# Patient Record
Sex: FEMALE | Race: WHITE | NOT HISPANIC OR LATINO | Employment: FULL TIME | URBAN - METROPOLITAN AREA
[De-identification: names, ages, dates, MRNs, and addresses within clinical notes are randomized per-mention and may not be internally consistent; named-entity substitution may affect disease eponyms.]

---

## 2023-08-28 LAB
THYROTROPIN (MIU/L) IN SER/PLAS BY DETECTION LIMIT <= 0.05 MIU/L: 3.92 MIU/L (ref 0.44–3.98)
THYROXINE (T4) FREE (NG/DL) IN SER/PLAS: 0.86 NG/DL (ref 0.61–1.12)

## 2023-08-29 LAB — TRIIODOTHYRONINE (T3) FREE (PG/ML) IN SER/PLAS: 4.2 PG/ML (ref 2.3–4.2)

## 2023-09-01 LAB
THYROGLOBULIN AB (IU/ML) IN SER/PLAS: <0.9 IU/ML (ref 0–4)
THYROGLOBULIN LC-MS/MS: ABNORMAL NG/ML (ref 1.3–31.8)
THYROGLOBULIN: <0.1 NG/ML (ref 1.3–31.8)

## 2023-11-27 DIAGNOSIS — I10 HTN (HYPERTENSION), BENIGN: Primary | ICD-10-CM

## 2023-11-29 RX ORDER — DILTIAZEM HYDROCHLORIDE 300 MG/1
300 CAPSULE, EXTENDED RELEASE ORAL DAILY
Qty: 90 CAPSULE | Refills: 0 | Status: SHIPPED | OUTPATIENT
Start: 2023-11-29 | End: 2023-12-20 | Stop reason: SDUPTHER

## 2023-12-20 ENCOUNTER — OFFICE VISIT (OUTPATIENT)
Dept: PRIMARY CARE | Facility: CLINIC | Age: 58
End: 2023-12-20
Payer: OTHER GOVERNMENT

## 2023-12-20 VITALS
DIASTOLIC BLOOD PRESSURE: 79 MMHG | BODY MASS INDEX: 24.34 KG/M2 | HEART RATE: 60 BPM | TEMPERATURE: 96.6 F | HEIGHT: 64 IN | WEIGHT: 142.6 LBS | SYSTOLIC BLOOD PRESSURE: 133 MMHG

## 2023-12-20 DIAGNOSIS — C73 MALIGNANT NEOPLASM OF THYROID GLAND (MULTI): Primary | ICD-10-CM

## 2023-12-20 DIAGNOSIS — Z00.00 WELL ADULT HEALTH CHECK: ICD-10-CM

## 2023-12-20 DIAGNOSIS — J30.9 ALLERGIC RHINITIS, UNSPECIFIED SEASONALITY, UNSPECIFIED TRIGGER: ICD-10-CM

## 2023-12-20 DIAGNOSIS — E89.0 HYPOTHYROIDISM, POSTSURGICAL: ICD-10-CM

## 2023-12-20 DIAGNOSIS — K57.90 DIVERTICULOSIS: ICD-10-CM

## 2023-12-20 DIAGNOSIS — Z12.31 ENCOUNTER FOR SCREENING MAMMOGRAM FOR MALIGNANT NEOPLASM OF BREAST: ICD-10-CM

## 2023-12-20 DIAGNOSIS — G43.809 OTHER MIGRAINE WITHOUT STATUS MIGRAINOSUS, NOT INTRACTABLE: ICD-10-CM

## 2023-12-20 DIAGNOSIS — K57.30 DIVERTICULOSIS OF COLON WITHOUT DIVERTICULITIS: ICD-10-CM

## 2023-12-20 DIAGNOSIS — E78.00 HYPERCHOLESTEROLEMIA: ICD-10-CM

## 2023-12-20 DIAGNOSIS — I10 HTN (HYPERTENSION), BENIGN: ICD-10-CM

## 2023-12-20 PROBLEM — R63.5 ABNORMAL WEIGHT GAIN: Status: ACTIVE | Noted: 2023-12-20

## 2023-12-20 PROBLEM — G43.909 MIGRAINE WITHOUT STATUS MIGRAINOSUS, NOT INTRACTABLE: Status: ACTIVE | Noted: 2023-12-20

## 2023-12-20 PROBLEM — R10.9 ABDOMINAL PAIN: Status: ACTIVE | Noted: 2023-12-20

## 2023-12-20 PROBLEM — K62.5 RECTAL BLEED: Status: ACTIVE | Noted: 2023-12-20

## 2023-12-20 PROBLEM — R53.83 FATIGUE: Status: ACTIVE | Noted: 2023-12-20

## 2023-12-20 PROBLEM — K63.5 COLON POLYPS: Status: ACTIVE | Noted: 2019-01-18

## 2023-12-20 PROCEDURE — 3078F DIAST BP <80 MM HG: CPT | Performed by: INTERNAL MEDICINE

## 2023-12-20 PROCEDURE — 3075F SYST BP GE 130 - 139MM HG: CPT | Performed by: INTERNAL MEDICINE

## 2023-12-20 PROCEDURE — 1036F TOBACCO NON-USER: CPT | Performed by: INTERNAL MEDICINE

## 2023-12-20 PROCEDURE — 99214 OFFICE O/P EST MOD 30 MIN: CPT | Performed by: INTERNAL MEDICINE

## 2023-12-20 RX ORDER — DILTIAZEM HYDROCHLORIDE 300 MG/1
300 CAPSULE, EXTENDED RELEASE ORAL DAILY
Qty: 90 CAPSULE | Refills: 1 | Status: SHIPPED | OUTPATIENT
Start: 2023-12-20

## 2023-12-20 RX ORDER — DESLORATADINE 5 MG/1
5 TABLET ORAL
Qty: 90 TABLET | Refills: 1 | Status: SHIPPED | OUTPATIENT
Start: 2023-12-20

## 2023-12-20 RX ORDER — LIOTHYRONINE SODIUM 5 UG/1
2 TABLET ORAL DAILY
COMMUNITY
Start: 2009-07-29 | End: 2024-05-08

## 2023-12-20 RX ORDER — DESLORATADINE 5 MG/1
5 TABLET ORAL
COMMUNITY
End: 2023-12-20 | Stop reason: SDUPTHER

## 2023-12-20 RX ORDER — LEVOTHYROXINE SODIUM 100 UG/1
100 TABLET ORAL
COMMUNITY
Start: 2014-01-29 | End: 2024-05-08 | Stop reason: SDUPTHER

## 2023-12-20 RX ORDER — MONTELUKAST SODIUM 10 MG/1
10 TABLET ORAL NIGHTLY
COMMUNITY
Start: 2023-11-09

## 2023-12-20 ASSESSMENT — PATIENT HEALTH QUESTIONNAIRE - PHQ9
SUM OF ALL RESPONSES TO PHQ9 QUESTIONS 1 AND 2: 0
2. FEELING DOWN, DEPRESSED OR HOPELESS: NOT AT ALL
1. LITTLE INTEREST OR PLEASURE IN DOING THINGS: NOT AT ALL

## 2024-03-01 ENCOUNTER — HOSPITAL ENCOUNTER (OUTPATIENT)
Dept: RADIOLOGY | Facility: CLINIC | Age: 59
Discharge: HOME | End: 2024-03-01
Payer: OTHER GOVERNMENT

## 2024-03-01 ENCOUNTER — LAB (OUTPATIENT)
Dept: LAB | Facility: LAB | Age: 59
End: 2024-03-01
Payer: OTHER GOVERNMENT

## 2024-03-01 VITALS — WEIGHT: 142.64 LBS | HEIGHT: 64 IN | BODY MASS INDEX: 24.35 KG/M2

## 2024-03-01 DIAGNOSIS — C73 MALIGNANT NEOPLASM OF THYROID GLAND (MULTI): Primary | ICD-10-CM

## 2024-03-01 DIAGNOSIS — Z12.31 ENCOUNTER FOR SCREENING MAMMOGRAM FOR MALIGNANT NEOPLASM OF BREAST: ICD-10-CM

## 2024-03-01 DIAGNOSIS — E89.0 POSTPROCEDURAL HYPOTHYROIDISM: ICD-10-CM

## 2024-03-01 LAB
T4 FREE SERPL-MCNC: 0.76 NG/DL (ref 0.61–1.12)
TSH SERPL-ACNC: 3.82 MIU/L (ref 0.44–3.98)

## 2024-03-01 PROCEDURE — 84432 ASSAY OF THYROGLOBULIN: CPT

## 2024-03-01 PROCEDURE — 36415 COLL VENOUS BLD VENIPUNCTURE: CPT

## 2024-03-01 PROCEDURE — 84443 ASSAY THYROID STIM HORMONE: CPT

## 2024-03-01 PROCEDURE — 77063 BREAST TOMOSYNTHESIS BI: CPT | Performed by: STUDENT IN AN ORGANIZED HEALTH CARE EDUCATION/TRAINING PROGRAM

## 2024-03-01 PROCEDURE — 77067 SCR MAMMO BI INCL CAD: CPT

## 2024-03-01 PROCEDURE — 77067 SCR MAMMO BI INCL CAD: CPT | Performed by: STUDENT IN AN ORGANIZED HEALTH CARE EDUCATION/TRAINING PROGRAM

## 2024-03-01 PROCEDURE — 84439 ASSAY OF FREE THYROXINE: CPT

## 2024-03-01 PROCEDURE — 86800 THYROGLOBULIN ANTIBODY: CPT

## 2024-03-04 ENCOUNTER — HOSPITAL ENCOUNTER (OUTPATIENT)
Dept: RADIOLOGY | Facility: EXTERNAL LOCATION | Age: 59
Discharge: HOME | End: 2024-03-04

## 2024-03-04 LAB
BILL ONLY-THYROGLOBULIN: NORMAL
THYROGLOB AB SERPL-ACNC: <0.9 IU/ML (ref 0–4)
THYROGLOB SERPL-MCNC: <0.1 NG/ML (ref 1.3–31.8)
THYROGLOB SERPL-MCNC: ABNORMAL NG/ML (ref 1.3–31.8)

## 2024-03-05 ENCOUNTER — LAB (OUTPATIENT)
Dept: LAB | Facility: LAB | Age: 59
End: 2024-03-05
Payer: OTHER GOVERNMENT

## 2024-03-05 DIAGNOSIS — Z00.00 WELL ADULT HEALTH CHECK: ICD-10-CM

## 2024-03-05 DIAGNOSIS — E78.00 HYPERCHOLESTEROLEMIA: ICD-10-CM

## 2024-03-05 DIAGNOSIS — K57.90 DIVERTICULOSIS: ICD-10-CM

## 2024-03-05 LAB
ALBUMIN SERPL BCP-MCNC: 4.2 G/DL (ref 3.4–5)
ALP SERPL-CCNC: 86 U/L (ref 33–110)
ALT SERPL W P-5'-P-CCNC: 21 U/L (ref 7–45)
ANION GAP SERPL CALC-SCNC: 12 MMOL/L (ref 10–20)
AST SERPL W P-5'-P-CCNC: 19 U/L (ref 9–39)
BASOPHILS # BLD AUTO: 0.05 X10*3/UL (ref 0–0.1)
BASOPHILS NFR BLD AUTO: 0.8 %
BILIRUB SERPL-MCNC: 0.6 MG/DL (ref 0–1.2)
BUN SERPL-MCNC: 24 MG/DL (ref 6–23)
CALCIUM SERPL-MCNC: 9.6 MG/DL (ref 8.6–10.6)
CHLORIDE SERPL-SCNC: 103 MMOL/L (ref 98–107)
CHOLEST SERPL-MCNC: 215 MG/DL (ref 0–199)
CHOLESTEROL/HDL RATIO: 3.1
CO2 SERPL-SCNC: 32 MMOL/L (ref 21–32)
CREAT SERPL-MCNC: 0.89 MG/DL (ref 0.5–1.05)
EGFRCR SERPLBLD CKD-EPI 2021: 76 ML/MIN/1.73M*2
EOSINOPHIL # BLD AUTO: 0.34 X10*3/UL (ref 0–0.7)
EOSINOPHIL NFR BLD AUTO: 5.4 %
ERYTHROCYTE [DISTWIDTH] IN BLOOD BY AUTOMATED COUNT: 12.5 % (ref 11.5–14.5)
GLUCOSE SERPL-MCNC: 95 MG/DL (ref 74–99)
HCT VFR BLD AUTO: 46.4 % (ref 36–46)
HDLC SERPL-MCNC: 70.4 MG/DL
HGB BLD-MCNC: 15.7 G/DL (ref 12–16)
IMM GRANULOCYTES # BLD AUTO: 0.01 X10*3/UL (ref 0–0.7)
IMM GRANULOCYTES NFR BLD AUTO: 0.2 % (ref 0–0.9)
LDLC SERPL CALC-MCNC: 134 MG/DL
LYMPHOCYTES # BLD AUTO: 1.61 X10*3/UL (ref 1.2–4.8)
LYMPHOCYTES NFR BLD AUTO: 25.4 %
MCH RBC QN AUTO: 30.2 PG (ref 26–34)
MCHC RBC AUTO-ENTMCNC: 33.8 G/DL (ref 32–36)
MCV RBC AUTO: 89 FL (ref 80–100)
MONOCYTES # BLD AUTO: 0.44 X10*3/UL (ref 0.1–1)
MONOCYTES NFR BLD AUTO: 6.9 %
NEUTROPHILS # BLD AUTO: 3.89 X10*3/UL (ref 1.2–7.7)
NEUTROPHILS NFR BLD AUTO: 61.3 %
NON HDL CHOLESTEROL: 145 MG/DL (ref 0–149)
NRBC BLD-RTO: 0 /100 WBCS (ref 0–0)
PLATELET # BLD AUTO: 269 X10*3/UL (ref 150–450)
POTASSIUM SERPL-SCNC: 4.5 MMOL/L (ref 3.5–5.3)
PROT SERPL-MCNC: 6.5 G/DL (ref 6.4–8.2)
RBC # BLD AUTO: 5.2 X10*6/UL (ref 4–5.2)
SODIUM SERPL-SCNC: 142 MMOL/L (ref 136–145)
TRIGL SERPL-MCNC: 51 MG/DL (ref 0–149)
VLDL: 10 MG/DL (ref 0–40)
WBC # BLD AUTO: 6.3 X10*3/UL (ref 4.4–11.3)

## 2024-03-05 PROCEDURE — 80053 COMPREHEN METABOLIC PANEL: CPT

## 2024-03-05 PROCEDURE — 36415 COLL VENOUS BLD VENIPUNCTURE: CPT

## 2024-03-05 PROCEDURE — 80061 LIPID PANEL: CPT

## 2024-03-05 PROCEDURE — 85025 COMPLETE CBC W/AUTO DIFF WBC: CPT

## 2024-04-16 ENCOUNTER — TELEPHONE (OUTPATIENT)
Dept: PRIMARY CARE | Facility: CLINIC | Age: 59
End: 2024-04-16
Payer: OTHER GOVERNMENT

## 2024-04-16 DIAGNOSIS — N64.89 BREAST ASYMMETRY: ICD-10-CM

## 2024-04-16 DIAGNOSIS — R92.8 ABNORMAL MAMMOGRAM: Primary | ICD-10-CM

## 2024-04-16 NOTE — TELEPHONE ENCOUNTER
Patient sent My Chart message for additional testing after her mammogram came back abnormal.  New order was placed in chart for diagnostic ultrasound.  Patient was notified and verbalized understanding.

## 2024-05-06 ENCOUNTER — LAB (OUTPATIENT)
Dept: LAB | Facility: LAB | Age: 59
End: 2024-05-06
Payer: OTHER GOVERNMENT

## 2024-05-06 ENCOUNTER — TELEPHONE (OUTPATIENT)
Dept: ENDOCRINOLOGY | Facility: CLINIC | Age: 59
End: 2024-05-06
Payer: OTHER GOVERNMENT

## 2024-05-06 DIAGNOSIS — C73 MALIGNANT NEOPLASM OF THYROID GLAND (MULTI): ICD-10-CM

## 2024-05-06 DIAGNOSIS — E89.0 HYPOTHYROIDISM, POSTSURGICAL: Primary | ICD-10-CM

## 2024-05-06 DIAGNOSIS — E89.0 HYPOTHYROIDISM, POSTSURGICAL: ICD-10-CM

## 2024-05-06 PROCEDURE — 84439 ASSAY OF FREE THYROXINE: CPT

## 2024-05-06 PROCEDURE — 84481 FREE ASSAY (FT-3): CPT

## 2024-05-06 PROCEDURE — 36415 COLL VENOUS BLD VENIPUNCTURE: CPT

## 2024-05-06 PROCEDURE — 84443 ASSAY THYROID STIM HORMONE: CPT

## 2024-05-06 NOTE — TELEPHONE ENCOUNTER
Marcia left a VM 5-6-24 stating she has an appt. 5-8-24 and asked if you have  Any labs you want done prior?  I can call her back and let her know today.  Please advise.  Thanks

## 2024-05-07 LAB
T3FREE SERPL-MCNC: 3.9 PG/ML (ref 2.3–4.2)
T4 FREE SERPL-MCNC: 1.27 NG/DL (ref 0.78–1.48)
TSH SERPL-ACNC: 0.85 MIU/L (ref 0.44–3.98)

## 2024-05-08 ENCOUNTER — OFFICE VISIT (OUTPATIENT)
Dept: ENDOCRINOLOGY | Facility: CLINIC | Age: 59
End: 2024-05-08
Payer: OTHER GOVERNMENT

## 2024-05-08 VITALS
HEART RATE: 77 BPM | HEIGHT: 64 IN | WEIGHT: 142.2 LBS | BODY MASS INDEX: 24.28 KG/M2 | SYSTOLIC BLOOD PRESSURE: 149 MMHG | DIASTOLIC BLOOD PRESSURE: 85 MMHG

## 2024-05-08 DIAGNOSIS — C73 MALIGNANT NEOPLASM OF THYROID GLAND (MULTI): ICD-10-CM

## 2024-05-08 DIAGNOSIS — E89.0 HYPOTHYROIDISM, POSTSURGICAL: ICD-10-CM

## 2024-05-08 DIAGNOSIS — E89.0 HYPOTHYROIDISM, POSTSURGICAL: Primary | ICD-10-CM

## 2024-05-08 PROCEDURE — 3079F DIAST BP 80-89 MM HG: CPT | Performed by: STUDENT IN AN ORGANIZED HEALTH CARE EDUCATION/TRAINING PROGRAM

## 2024-05-08 PROCEDURE — 3077F SYST BP >= 140 MM HG: CPT | Performed by: STUDENT IN AN ORGANIZED HEALTH CARE EDUCATION/TRAINING PROGRAM

## 2024-05-08 PROCEDURE — 99214 OFFICE O/P EST MOD 30 MIN: CPT | Performed by: STUDENT IN AN ORGANIZED HEALTH CARE EDUCATION/TRAINING PROGRAM

## 2024-05-08 RX ORDER — LIOTHYRONINE SODIUM 5 UG/1
10 TABLET ORAL DAILY
Qty: 180 TABLET | Refills: 1 | Status: SHIPPED | OUTPATIENT
Start: 2024-05-08

## 2024-05-08 RX ORDER — LEVOTHYROXINE SODIUM 100 UG/1
TABLET ORAL
Qty: 90 TABLET | Refills: 1 | Status: SHIPPED | OUTPATIENT
Start: 2024-05-08

## 2024-05-08 NOTE — PROGRESS NOTES
Subjective   Patient ID: Marcia Krishna is a 58 y.o. female who presents for Thyroid Cancer (59 yo female with ho PTC s/p total thyroidectomy in 2007 by Dr. 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  HPI    The patient is a 59 yo female with ho PTC s/p total thyroidectomy in 2007 by Dr. 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  She was on Suppressive LT4 therpay for > 10 years , now with wth normal TSH goals   she reports having difficulty maintaining weight and energy and on lower Lt4 dose , she is currently o n 100 mcg  daily   Was on 100 mcg 6 pills/ week  , prior to that 112 mcg 6 pills/week      1/2023 TSH 2.3 , TG 0.1  Review of Systems    Objective   Physical Exam  Constitutional:       Appearance: Normal appearance.   Neck:      Thyroid: No thyroid mass.   Cardiovascular:      Rate and Rhythm: Normal rate and regular rhythm.   Pulmonary:      Effort: Pulmonary effort is normal.      Breath sounds: Normal breath sounds.   Lymphadenopathy:      Cervical: No cervical adenopathy.   Neurological:      General: No focal deficit present.      Mental Status: She is alert.   Psychiatric:         Mood and Affect: Mood normal.         Assessment/Plan        The patient is a 59 yo female with ho PTC s/p total thyroidectomy in 2007 by Dr. 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 TG 0.1     RTC in 6 months with labs

## 2024-05-28 ENCOUNTER — HOSPITAL ENCOUNTER (OUTPATIENT)
Dept: RADIOLOGY | Facility: CLINIC | Age: 59
Discharge: HOME | End: 2024-05-28
Payer: OTHER GOVERNMENT

## 2024-05-28 DIAGNOSIS — N64.89 BREAST ASYMMETRY: ICD-10-CM

## 2024-05-28 DIAGNOSIS — R92.8 ABNORMAL MAMMOGRAM: ICD-10-CM

## 2024-05-28 PROCEDURE — 76642 ULTRASOUND BREAST LIMITED: CPT | Mod: LT

## 2024-05-28 PROCEDURE — 77065 DX MAMMO INCL CAD UNI: CPT | Mod: LEFT SIDE | Performed by: RADIOLOGY

## 2024-05-28 PROCEDURE — 76982 USE 1ST TARGET LESION: CPT | Mod: LT

## 2024-05-28 PROCEDURE — 77061 BREAST TOMOSYNTHESIS UNI: CPT | Mod: LT

## 2024-05-28 PROCEDURE — 77061 BREAST TOMOSYNTHESIS UNI: CPT | Mod: LEFT SIDE | Performed by: RADIOLOGY

## 2024-05-28 PROCEDURE — 76642 ULTRASOUND BREAST LIMITED: CPT | Mod: LEFT SIDE | Performed by: RADIOLOGY

## 2024-06-19 DIAGNOSIS — J30.9 ALLERGIC RHINITIS, UNSPECIFIED SEASONALITY, UNSPECIFIED TRIGGER: ICD-10-CM

## 2024-06-20 RX ORDER — DESLORATADINE 5 MG/1
5 TABLET ORAL DAILY
Qty: 90 TABLET | Refills: 0 | Status: SHIPPED | OUTPATIENT
Start: 2024-06-20

## 2024-06-20 RX ORDER — MONTELUKAST SODIUM 10 MG/1
10 TABLET ORAL DAILY
Qty: 90 TABLET | Refills: 0 | Status: SHIPPED | OUTPATIENT
Start: 2024-06-20

## 2024-07-17 DIAGNOSIS — I10 HTN (HYPERTENSION), BENIGN: ICD-10-CM

## 2024-07-18 RX ORDER — DILTIAZEM HYDROCHLORIDE 300 MG/1
300 CAPSULE, EXTENDED RELEASE ORAL DAILY
Qty: 90 CAPSULE | Refills: 3 | Status: SHIPPED | OUTPATIENT
Start: 2024-07-18

## 2024-09-10 DIAGNOSIS — J30.9 ALLERGIC RHINITIS, UNSPECIFIED SEASONALITY, UNSPECIFIED TRIGGER: ICD-10-CM

## 2024-09-12 RX ORDER — DESLORATADINE 5 MG/1
5 TABLET ORAL DAILY
Qty: 90 TABLET | Refills: 1 | Status: SHIPPED | OUTPATIENT
Start: 2024-09-12

## 2024-09-25 DIAGNOSIS — J30.9 ALLERGIC RHINITIS, UNSPECIFIED SEASONALITY, UNSPECIFIED TRIGGER: ICD-10-CM

## 2024-09-25 DIAGNOSIS — E89.0 HYPOTHYROIDISM, POSTSURGICAL: ICD-10-CM

## 2024-09-26 RX ORDER — LIOTHYRONINE SODIUM 5 UG/1
10 TABLET ORAL DAILY
Qty: 180 TABLET | Refills: 0 | Status: SHIPPED | OUTPATIENT
Start: 2024-09-26

## 2024-09-26 RX ORDER — LEVOTHYROXINE SODIUM 100 UG/1
TABLET ORAL
Qty: 90 TABLET | Refills: 0 | Status: SHIPPED | OUTPATIENT
Start: 2024-09-26

## 2024-09-27 RX ORDER — MONTELUKAST SODIUM 10 MG/1
10 TABLET ORAL DAILY
Qty: 90 TABLET | Refills: 0 | Status: SHIPPED | OUTPATIENT
Start: 2024-09-27

## 2024-11-14 ENCOUNTER — APPOINTMENT (OUTPATIENT)
Dept: ENDOCRINOLOGY | Facility: CLINIC | Age: 59
End: 2024-11-14
Payer: OTHER GOVERNMENT

## 2024-11-20 ENCOUNTER — OFFICE VISIT (OUTPATIENT)
Dept: PRIMARY CARE | Facility: CLINIC | Age: 59
End: 2024-11-20
Payer: OTHER GOVERNMENT

## 2024-11-20 ENCOUNTER — HOSPITAL ENCOUNTER (OUTPATIENT)
Dept: RADIOLOGY | Facility: CLINIC | Age: 59
Discharge: HOME | End: 2024-11-20
Payer: OTHER GOVERNMENT

## 2024-11-20 ENCOUNTER — LAB (OUTPATIENT)
Dept: LAB | Facility: LAB | Age: 59
End: 2024-11-20
Payer: OTHER GOVERNMENT

## 2024-11-20 VITALS
HEART RATE: 78 BPM | SYSTOLIC BLOOD PRESSURE: 136 MMHG | WEIGHT: 142 LBS | DIASTOLIC BLOOD PRESSURE: 92 MMHG | OXYGEN SATURATION: 96 % | BODY MASS INDEX: 24.24 KG/M2 | HEIGHT: 64 IN

## 2024-11-20 DIAGNOSIS — J06.9 ACUTE UPPER RESPIRATORY INFECTION: ICD-10-CM

## 2024-11-20 DIAGNOSIS — J04.0 LARYNGITIS: ICD-10-CM

## 2024-11-20 DIAGNOSIS — I10 HTN (HYPERTENSION), BENIGN: ICD-10-CM

## 2024-11-20 DIAGNOSIS — R06.2 WHEEZING: ICD-10-CM

## 2024-11-20 DIAGNOSIS — J04.0 LARYNGITIS: Primary | ICD-10-CM

## 2024-11-20 DIAGNOSIS — J30.9 ALLERGIC RHINITIS, UNSPECIFIED SEASONALITY, UNSPECIFIED TRIGGER: ICD-10-CM

## 2024-11-20 DIAGNOSIS — E89.0 HYPOTHYROIDISM, POSTSURGICAL: ICD-10-CM

## 2024-11-20 LAB — T4 FREE SERPL-MCNC: 1.27 NG/DL (ref 0.78–1.48)

## 2024-11-20 PROCEDURE — 3080F DIAST BP >= 90 MM HG: CPT | Performed by: INTERNAL MEDICINE

## 2024-11-20 PROCEDURE — 3075F SYST BP GE 130 - 139MM HG: CPT | Performed by: INTERNAL MEDICINE

## 2024-11-20 PROCEDURE — 1036F TOBACCO NON-USER: CPT | Performed by: INTERNAL MEDICINE

## 2024-11-20 PROCEDURE — 84439 ASSAY OF FREE THYROXINE: CPT

## 2024-11-20 PROCEDURE — 36415 COLL VENOUS BLD VENIPUNCTURE: CPT

## 2024-11-20 PROCEDURE — 71046 X-RAY EXAM CHEST 2 VIEWS: CPT | Performed by: RADIOLOGY

## 2024-11-20 PROCEDURE — 71046 X-RAY EXAM CHEST 2 VIEWS: CPT

## 2024-11-20 PROCEDURE — 99214 OFFICE O/P EST MOD 30 MIN: CPT | Performed by: INTERNAL MEDICINE

## 2024-11-20 PROCEDURE — 3008F BODY MASS INDEX DOCD: CPT | Performed by: INTERNAL MEDICINE

## 2024-11-20 PROCEDURE — 86800 THYROGLOBULIN ANTIBODY: CPT | Performed by: STUDENT IN AN ORGANIZED HEALTH CARE EDUCATION/TRAINING PROGRAM

## 2024-11-20 RX ORDER — ALBUTEROL SULFATE 90 UG/1
2 INHALANT RESPIRATORY (INHALATION) EVERY 4 HOURS PRN
Qty: 8.5 G | Refills: 0 | Status: SHIPPED | OUTPATIENT
Start: 2024-11-20 | End: 2025-11-20

## 2024-11-20 RX ORDER — AMOXICILLIN AND CLAVULANATE POTASSIUM 875; 125 MG/1; MG/1
875 TABLET, FILM COATED ORAL 2 TIMES DAILY
Qty: 20 TABLET | Refills: 0 | Status: SHIPPED | OUTPATIENT
Start: 2024-11-20 | End: 2024-11-20

## 2024-11-20 RX ORDER — PREDNISONE 10 MG/1
TABLET ORAL
Qty: 30 TABLET | Refills: 0 | Status: SHIPPED | OUTPATIENT
Start: 2024-11-20 | End: 2024-11-30

## 2024-11-20 RX ORDER — CETIRIZINE HYDROCHLORIDE 10 MG/1
10 TABLET ORAL DAILY
Start: 2024-11-20 | End: 2025-02-18

## 2024-11-20 RX ORDER — ALBUTEROL SULFATE 90 UG/1
2 INHALANT RESPIRATORY (INHALATION) EVERY 4 HOURS PRN
Qty: 8.5 G | Refills: 0 | Status: SHIPPED | OUTPATIENT
Start: 2024-11-20 | End: 2024-11-20

## 2024-11-20 RX ORDER — AMOXICILLIN AND CLAVULANATE POTASSIUM 875; 125 MG/1; MG/1
875 TABLET, FILM COATED ORAL 2 TIMES DAILY
Qty: 20 TABLET | Refills: 0 | Status: SHIPPED | OUTPATIENT
Start: 2024-11-20 | End: 2024-11-30

## 2024-11-20 RX ORDER — PANTOPRAZOLE SODIUM 40 MG/1
40 TABLET, DELAYED RELEASE ORAL DAILY
Qty: 30 TABLET | Refills: 0 | Status: SHIPPED | OUTPATIENT
Start: 2024-11-20 | End: 2025-01-19

## 2024-11-20 RX ORDER — PREDNISONE 10 MG/1
TABLET ORAL
Qty: 30 TABLET | Refills: 0 | Status: SHIPPED | OUTPATIENT
Start: 2024-11-20 | End: 2024-11-20

## 2024-11-20 ASSESSMENT — PATIENT HEALTH QUESTIONNAIRE - PHQ9
2. FEELING DOWN, DEPRESSED OR HOPELESS: NOT AT ALL
1. LITTLE INTEREST OR PLEASURE IN DOING THINGS: NOT AT ALL
SUM OF ALL RESPONSES TO PHQ9 QUESTIONS 1 AND 2: 0

## 2024-11-20 ASSESSMENT — LIFESTYLE VARIABLES
HOW OFTEN DO YOU HAVE SIX OR MORE DRINKS ON ONE OCCASION: PATIENT DECLINED
SKIP TO QUESTIONS 9-10: 0
HOW MANY STANDARD DRINKS CONTAINING ALCOHOL DO YOU HAVE ON A TYPICAL DAY: 1 OR 2
HOW OFTEN DO YOU HAVE A DRINK CONTAINING ALCOHOL: 2-4 TIMES A MONTH
AUDIT-C TOTAL SCORE: -1

## 2024-11-20 NOTE — PROGRESS NOTES
Assessment/Plan     59 years old female who has history of hypertension has slightly elevated blood pressure on this visit but she says it is situational because of her throat discomfort.  She is not on any high antihypertensive medication and she understands to watch the blood pressure.    Patient has acute upper respiratory tract infection with probably laryngitis on my clinical exam.  But because of her wheezing and significant symptoms she will have a chest x-ray done to make sure there is no underlying acute lung pathology.  She will be treated with tapering dose of steroids and Zyrtec.  Will put her on Augmentin for the infection.  We will empirically also treat her with pantoprazole.  She was advised to follow-up with the PCP in about 3 to 4 weeks.  Patient also understands that if she gets significantly short of breath or any other concern she should immediately go to the ER.        Problem List Items Addressed This Visit       HTN (hypertension), benign    Acute upper respiratory infection    Relevant Orders    XR chest 2 views    Allergic rhinitis    Relevant Medications    albuterol (ProAir HFA) 90 mcg/actuation inhaler    Laryngitis - Primary    Relevant Medications    cetirizine (ZyrTEC) 10 mg tablet    amoxicillin-pot clavulanate (Augmentin) 875-125 mg tablet    predniSONE (Deltasone) 10 mg tablet    pantoprazole (ProtoNix) 40 mg EC tablet    Other Relevant Orders    XR chest 2 views    Wheezing    Relevant Medications    cetirizine (ZyrTEC) 10 mg tablet    albuterol (ProAir HFA) 90 mcg/actuation inhaler    predniSONE (Deltasone) 10 mg tablet    Other Relevant Orders    XR chest 2 views       Subjective     Patient ID: Marcia Krishna is a 59 y.o. female who presents for Asthma (Hurts to swallow, allergies x Sunday).    History of present illness  59 years old female who has had a history of asthma came for an acute visit having some discomfort in the lower throat area and also feeling like  wheezing.    Patient was [place] cabin [place] and her 3 dogs.  She says every time she goes to the cabin she gets some symptoms like this.  She stayed there for few days and came back and she developed some discomfort in the throat and now she has pain when she is swallowing.  There is no pain when she is taking a deep breath but she says when she is exhaling she has irritation and coughing.  She also has pain when she swallows food.  She has no fever or chills.  She denies anyone who visited the cabin is ill.  She has no chest pain.  She denies any short of breath on exertion she is doing her normal activities.  No palpitation no urine bowel changes.  No abdominal pain.  She has had previously some reflux but she does not feel like this is her reflux bothering her.  She takes Singulair regularly but she does not take any preventative inhalers.  She had some updated albuterol inhaler which she was trying to use it but did not help.    She has Zyrtec at home and she is not taking it at this time.  She is also not taking the Clarinex.    Family History   Problem Relation Name Age of Onset    Hypertension Mother      Osteoporosis Mother      Stroke Mother      Other (cardiac disorder) Father      Other (coagulation disorder) Father      Hypertension Father        Social History     Socioeconomic History    Marital status:      Spouse name: Not on file    Number of children: Not on file    Years of education: Not on file    Highest education level: Not on file   Occupational History    Not on file   Tobacco Use    Smoking status: Never     Passive exposure: Past    Smokeless tobacco: Never   Vaping Use    Vaping status: Never Used   Substance and Sexual Activity    Alcohol use: Yes     Alcohol/week: 1.0 standard drink of alcohol     Types: 1 Standard drinks or equivalent per week    Drug use: Never    Sexual activity: Not on file   Other Topics Concern    Not on file   Social History Narrative    Not  on file     Social Drivers of Health     Financial Resource Strain: Not on file   Food Insecurity: Not on file   Transportation Needs: Not on file   Physical Activity: Not on file   Stress: Not on file   Social Connections: Not on file   Intimate Partner Violence: Not on file   Housing Stability: Not on file      Mold, Pine needle oil, and Pollen extracts   Current Outpatient Medications   Medication Sig Dispense Refill    desloratadine (Clarinex) 5 mg tablet TAKE 1 TABLET DAILY 90 tablet 1    dilTIAZem ER (Tiazac) 300 mg 24 hr capsule TAKE 1 CAPSULE DAILY 90 capsule 3    levothyroxine (Synthroid, Levoxyl) 100 mcg tablet TAKE 1 TABLET DAILY 90 tablet 0    liothyronine (Cytomel) 5 mcg tablet TAKE 2 TABLETS DAILY 180 tablet 0    montelukast (Singulair) 10 mg tablet TAKE 1 TABLET DAILY AS DIRECTED 90 tablet 0    albuterol (ProAir HFA) 90 mcg/actuation inhaler Inhale 2 puffs every 4 hours if needed for wheezing or shortness of breath. 8.5 g 0    amoxicillin-pot clavulanate (Augmentin) 875-125 mg tablet Take 1 tablet (875 mg) by mouth 2 times a day for 10 days. 20 tablet 0    cetirizine (ZyrTEC) 10 mg tablet Take 1 tablet (10 mg) by mouth once daily.      pantoprazole (ProtoNix) 40 mg EC tablet Take 1 tablet (40 mg) by mouth once daily. Do not crush, chew, or split. 30 tablet 0    predniSONE (Deltasone) 10 mg tablet Take 5 tablets (50 mg) by mouth once daily for 2 days, THEN 4 tablets (40 mg) once daily for 2 days, THEN 3 tablets (30 mg) once daily for 2 days, THEN 2 tablets (20 mg) once daily for 2 days, THEN 1 tablet (10 mg) once daily for 2 days. 30 tablet 0     No current facility-administered medications for this visit.        Objective     Vitals:    11/20/24 0926   BP: (!) 136/92   Pulse: 78   SpO2: 96%        Physical Exam  Alert, oriented x3, not in distress.  Not pale, no cyanosis, no icterus. No skin rash  Neck:  Supple.  No JVD. No thyromegaly, trachea in the midline  No clubbing.  Throat is mild inflamed but  no tonsillar exudates. No lymphadenopathy. Ears mild congested tympanic membrane but no discharge or perforation.  Has mild discomfort on the anterior larynx on palpation.  No redness or warmness.  No soft tissue swelling in the neck.  Respiratory System:  Vesicular breathing moderate air entry and breath sounds.  Rare wheezing and no rales. No pleural rub.  Cardiovascular:  S1 and S2 positive.  No murmur.  Regular rate and rhythm.  Extremities:  Peripheral pulses present.  No edema. Gait is normal          Problem List Items Addressed This Visit       HTN (hypertension), benign    Acute upper respiratory infection    Relevant Orders    XR chest 2 views    Allergic rhinitis    Relevant Medications    albuterol (ProAir HFA) 90 mcg/actuation inhaler    Laryngitis - Primary    Relevant Medications    cetirizine (ZyrTEC) 10 mg tablet    amoxicillin-pot clavulanate (Augmentin) 875-125 mg tablet    predniSONE (Deltasone) 10 mg tablet    pantoprazole (ProtoNix) 40 mg EC tablet    Other Relevant Orders    XR chest 2 views    Wheezing    Relevant Medications    cetirizine (ZyrTEC) 10 mg tablet    albuterol (ProAir HFA) 90 mcg/actuation inhaler    predniSONE (Deltasone) 10 mg tablet    Other Relevant Orders    XR chest 2 views        Orders Placed This Encounter   Procedures    XR chest 2 views     Standing Status:   Future     Standing Expiration Date:   11/20/2025     Order Specific Question:   Reason for exam:     Answer:   cough, wheezing     Order Specific Question:   Radiologist to Determine Optimal Study     Answer:   Yes     Order Specific Question:   Release result to [facility]     Answer:   Immediate [1]     Order Specific Question:   Is this exam part of a Research Study? If Yes, link this order to the research study     Answer:   No     Order Specific Question:   Is the patient pregnant?     Answer:   No        Lab Results   Component Value Date    WBC 6.3 03/05/2024    HGB 15.7 03/05/2024    HCT 46.4 (H) 03/05/2024      03/05/2024    CHOL 215 (H) 03/05/2024    TRIG 51 03/05/2024    HDL 70.4 03/05/2024    ALT 21 03/05/2024    AST 19 03/05/2024     03/05/2024    K 4.5 03/05/2024     03/05/2024    CREATININE 0.89 03/05/2024    BUN 24 (H) 03/05/2024    CO2 32 03/05/2024    TSH 0.85 05/06/2024     Lab Results   Component Value Date    CHOL 215 (H) 03/05/2024    LDLCALC 134 (H) 03/05/2024    CHHDL 3.1 03/05/2024       No results found.

## 2024-11-22 NOTE — RESULT ENCOUNTER NOTE
Chest x-ray results are acceptable.  We will review the results in detail during office follow-up and please advise patient to keep the follow-up appointment as scheduled.
none

## 2024-11-26 LAB — SCAN RESULT: ABNORMAL

## 2024-12-10 ENCOUNTER — TELEPHONE (OUTPATIENT)
Dept: PRIMARY CARE | Facility: CLINIC | Age: 59
End: 2024-12-10
Payer: OTHER GOVERNMENT

## 2024-12-10 NOTE — TELEPHONE ENCOUNTER
Pt had an appt 11/20 with you and still not feeling any better, chest and head cold. Anywhere we can squeeze pt in this week? Please call pt at [phone]. Thanks

## 2024-12-11 ENCOUNTER — OFFICE VISIT (OUTPATIENT)
Dept: PRIMARY CARE | Facility: CLINIC | Age: 59
End: 2024-12-11
Payer: OTHER GOVERNMENT

## 2024-12-11 VITALS
HEIGHT: 64 IN | BODY MASS INDEX: 24.24 KG/M2 | DIASTOLIC BLOOD PRESSURE: 84 MMHG | SYSTOLIC BLOOD PRESSURE: 127 MMHG | HEART RATE: 62 BPM | WEIGHT: 142 LBS

## 2024-12-11 DIAGNOSIS — J30.9 ALLERGIC RHINITIS, UNSPECIFIED SEASONALITY, UNSPECIFIED TRIGGER: ICD-10-CM

## 2024-12-11 DIAGNOSIS — R06.2 WHEEZING: ICD-10-CM

## 2024-12-11 DIAGNOSIS — J01.90 ACUTE SINUSITIS, RECURRENCE NOT SPECIFIED, UNSPECIFIED LOCATION: Primary | ICD-10-CM

## 2024-12-11 DIAGNOSIS — I10 HTN (HYPERTENSION), BENIGN: ICD-10-CM

## 2024-12-11 PROCEDURE — 3008F BODY MASS INDEX DOCD: CPT | Performed by: INTERNAL MEDICINE

## 2024-12-11 PROCEDURE — 3079F DIAST BP 80-89 MM HG: CPT | Performed by: INTERNAL MEDICINE

## 2024-12-11 PROCEDURE — 1036F TOBACCO NON-USER: CPT | Performed by: INTERNAL MEDICINE

## 2024-12-11 PROCEDURE — 99214 OFFICE O/P EST MOD 30 MIN: CPT | Performed by: INTERNAL MEDICINE

## 2024-12-11 PROCEDURE — 3074F SYST BP LT 130 MM HG: CPT | Performed by: INTERNAL MEDICINE

## 2024-12-11 RX ORDER — FLUTICASONE PROPIONATE 50 MCG
1 SPRAY, SUSPENSION (ML) NASAL DAILY
Qty: 16 G | Refills: 11 | Status: SHIPPED | OUTPATIENT
Start: 2024-12-11 | End: 2025-12-11

## 2024-12-11 RX ORDER — AMOXICILLIN AND CLAVULANATE POTASSIUM 875; 125 MG/1; MG/1
875 TABLET, FILM COATED ORAL 2 TIMES DAILY
Qty: 20 TABLET | Refills: 0 | Status: SHIPPED | OUTPATIENT
Start: 2024-12-11 | End: 2024-12-21

## 2024-12-11 RX ORDER — PREDNISONE 10 MG/1
TABLET ORAL
Qty: 30 TABLET | Refills: 0 | Status: SHIPPED | OUTPATIENT
Start: 2024-12-11 | End: 2024-12-21

## 2024-12-11 ASSESSMENT — LIFESTYLE VARIABLES
HAVE YOU OR SOMEONE ELSE BEEN INJURED AS A RESULT OF YOUR DRINKING: NO
HOW MANY STANDARD DRINKS CONTAINING ALCOHOL DO YOU HAVE ON A TYPICAL DAY: 1 OR 2
AUDIT TOTAL SCORE: 1
SKIP TO QUESTIONS 9-10: 1
AUDIT-C TOTAL SCORE: 1
HAS A RELATIVE, FRIEND, DOCTOR, OR ANOTHER HEALTH PROFESSIONAL EXPRESSED CONCERN ABOUT YOUR DRINKING OR SUGGESTED YOU CUT DOWN: NO
HOW OFTEN DO YOU HAVE SIX OR MORE DRINKS ON ONE OCCASION: NEVER
HOW OFTEN DO YOU HAVE A DRINK CONTAINING ALCOHOL: MONTHLY OR LESS

## 2024-12-11 ASSESSMENT — PATIENT HEALTH QUESTIONNAIRE - PHQ9
SUM OF ALL RESPONSES TO PHQ9 QUESTIONS 1 AND 2: 0
1. LITTLE INTEREST OR PLEASURE IN DOING THINGS: NOT AT ALL
2. FEELING DOWN, DEPRESSED OR HOPELESS: NOT AT ALL

## 2024-12-11 NOTE — PROGRESS NOTES
Assessment/Plan   59 years old female with a history of hypertension has symptoms of acute sinusitis but also has questionable allergic rhinitis.  I had a lengthy discussion with the patient about the treatment options and also prevention.  On exam she has mild wheezing.  She will be treated with Augmentin as an antibiotic and discussed the side effects of it.  She understands to take yogurt or probiotic with it.    Because of her wheezing we will restart her the prednisone tapering dose and also advised her to use the fluticasone nasal spray.    Patient has been using the inhalers and I discussed with the patient about the montelukast and the side effects which she will talk to the PCP.    Patient's blood pressure is well-controlled.    I discussed with the patient that if the symptoms recurs or become recurrent she may need to have allergy immunologist or ENT consult which she verbalizes understanding.  She was advised to follow-up with the PCP in 3 months except if there is any concern she should call us to be seen sooner.          Problem List Items Addressed This Visit       HTN (hypertension), benign    Allergic rhinitis    Relevant Medications    fluticasone (Flonase) 50 mcg/actuation nasal spray    predniSONE (Deltasone) 10 mg tablet    Wheezing    Relevant Medications    predniSONE (Deltasone) 10 mg tablet    Acute sinusitis - Primary    Relevant Medications    amoxicillin-pot clavulanate (Augmentin) 875-125 mg tablet    predniSONE (Deltasone) 10 mg tablet       Subjective     Patient ID: Marcia Krishna is a 59 y.o. female who presents for still congested, head and chest and Cough (Hard to exhale).    History of present illness  59 years old female who is generally healthy has history of hypertension and hypothyroidism was seen for an acute visit.  She was treated for acute laryngitis and possible upper respite tract infection about 3 weeks ago and patient says her laryngeal symptoms improved in about 2 to 3  days of the treatment.  But now she is having congestion in the sinuses some headache facial pain and also cough.  She complains that similar symptoms occurs to her almost every early winter and sometimes it keeps on lingering throughout the winter season.  She denies any allergens in the house.  She has dog but nothing changed.  She has no new curtain.  She has no [place] tree and she uses the artificial tree because of concern about allergies.  She has not changed the personal products.  She is a non-smoker but she has had secondhand smoking exposure when she was growing up from the parents.    She has no fever no chills no chest pain.    During her last visit patient had a chest x-ray done which was without any acute abnormalities including no pneumonia.    Patient is continue her medications including the nasal spray.      Family History   Problem Relation Name Age of Onset    Hypertension Mother      Osteoporosis Mother      Stroke Mother      Other (cardiac disorder) Father      Other (coagulation disorder) Father      Hypertension Father        Social History     Socioeconomic History    Marital status:      Spouse name: Not on file    Number of children: Not on file    Years of education: Not on file    Highest education level: Not on file   Occupational History    Not on file   Tobacco Use    Smoking status: Never     Passive exposure: Past    Smokeless tobacco: Never   Vaping Use    Vaping status: Never Used   Substance and Sexual Activity    Alcohol use: Yes     Alcohol/week: 1.0 standard drink of alcohol     Types: 1 Standard drinks or equivalent per week    Drug use: Never    Sexual activity: Not on file   Other Topics Concern    Not on file   Social History Narrative    Not on file     Social Drivers of Health     Financial Resource Strain: Not on file   Food Insecurity: Not on file   Transportation Needs: Not on file   Physical Activity: Not on file   Stress: Not on file   Social Connections:  Not on file   Intimate Partner Violence: Not on file   Housing Stability: Not on file      Mold, Pine needle oil, and Pollen extracts   Current Outpatient Medications   Medication Sig Dispense Refill    albuterol (ProAir HFA) 90 mcg/actuation inhaler Inhale 2 puffs every 4 hours if needed for wheezing or shortness of breath. 8.5 g 0    cetirizine (ZyrTEC) 10 mg tablet Take 1 tablet (10 mg) by mouth once daily.      dilTIAZem ER (Tiazac) 300 mg 24 hr capsule TAKE 1 CAPSULE DAILY 90 capsule 3    levothyroxine (Synthroid, Levoxyl) 100 mcg tablet TAKE 1 TABLET DAILY 90 tablet 0    liothyronine (Cytomel) 5 mcg tablet TAKE 2 TABLETS DAILY 180 tablet 0    montelukast (Singulair) 10 mg tablet TAKE 1 TABLET DAILY AS DIRECTED 90 tablet 0    pantoprazole (ProtoNix) 40 mg EC tablet Take 1 tablet (40 mg) by mouth once daily. Do not crush, chew, or split. 30 tablet 0    amoxicillin-pot clavulanate (Augmentin) 875-125 mg tablet Take 1 tablet (875 mg) by mouth 2 times a day for 10 days. 20 tablet 0    fluticasone (Flonase) 50 mcg/actuation nasal spray Administer 1 spray into each nostril once daily. Shake gently. Before first use, prime pump. After use, clean tip and replace cap. 16 g 11    predniSONE (Deltasone) 10 mg tablet Take 5 tablets (50 mg) by mouth once daily for 2 days, THEN 4 tablets (40 mg) once daily for 2 days, THEN 3 tablets (30 mg) once daily for 2 days, THEN 2 tablets (20 mg) once daily for 2 days, THEN 1 tablet (10 mg) once daily for 2 days. 30 tablet 0     No current facility-administered medications for this visit.        Objective     Vitals:    12/11/24 1200   BP: 127/84   Pulse: 62        Physical Exam  Alert, oriented x3, not in distress.  Not pale, no cyanosis, no icterus. No skin rash  Neck:  Supple.  No JVD. No thyromegaly, trachea in the midline  No clubbing.  Throat no significant inflamed but no tonsillar exudates. No lymphadenopathy. Ears normal.  Has mild tenderness in the frontal sinuses.  Also has  nasal congestion.  Respiratory System:  Vesicular breathing moderate air entry and breath sounds.  Rare wheezing and no rales. No pleural rub.  Cardiovascular:  S1 and S2 positive.  No murmur.  Regular rate and rhythm.  Extremities:  Peripheral pulses present.  No edema. Gait is normal        Problem List Items Addressed This Visit       HTN (hypertension), benign    Allergic rhinitis    Relevant Medications    fluticasone (Flonase) 50 mcg/actuation nasal spray    predniSONE (Deltasone) 10 mg tablet    Wheezing    Relevant Medications    predniSONE (Deltasone) 10 mg tablet    Acute sinusitis - Primary    Relevant Medications    amoxicillin-pot clavulanate (Augmentin) 875-125 mg tablet    predniSONE (Deltasone) 10 mg tablet        No orders of the defined types were placed in this encounter.       Lab Results   Component Value Date    WBC 6.3 03/05/2024    HGB 15.7 03/05/2024    HCT 46.4 (H) 03/05/2024     03/05/2024    CHOL 215 (H) 03/05/2024    TRIG 51 03/05/2024    HDL 70.4 03/05/2024    ALT 21 03/05/2024    AST 19 03/05/2024     03/05/2024    K 4.5 03/05/2024     03/05/2024    CREATININE 0.89 03/05/2024    BUN 24 (H) 03/05/2024    CO2 32 03/05/2024    TSH 0.85 05/06/2024     Lab Results   Component Value Date    CHOL 215 (H) 03/05/2024    LDLCALC 134 (H) 03/05/2024    CHHDL 3.1 03/05/2024       XR chest 2 views    Result Date: 11/21/2024  Interpreted By:  Raya Kearney, STUDY: Chest, 2 views.   INDICATION: Signs/Symptoms:cough, wheezing.   COMPARISON: 01/20/2023.   ACCESSION NUMBER(S): [id number]   ORDERING CLINICIAN: OLEGARIO CUBA   FINDINGS: The cardiomediastinal silhouette size is within normal limits.   There is no focal consolidation, edema or pneumothorax. No sizeable pleural effusion.       1. No acute cardiopulmonary process.   MACRO: None.   Signed by: Raya Kearney 11/21/2024 6:41 PM Dictation workstation:   [id number]

## 2024-12-17 DIAGNOSIS — J30.9 ALLERGIC RHINITIS, UNSPECIFIED SEASONALITY, UNSPECIFIED TRIGGER: ICD-10-CM

## 2024-12-20 RX ORDER — MONTELUKAST SODIUM 10 MG/1
10 TABLET ORAL DAILY
Qty: 90 TABLET | Refills: 0 | Status: SHIPPED | OUTPATIENT
Start: 2024-12-20

## 2025-01-06 ENCOUNTER — APPOINTMENT (OUTPATIENT)
Dept: ENDOCRINOLOGY | Facility: CLINIC | Age: 60
End: 2025-01-06
Payer: OTHER GOVERNMENT

## 2025-01-06 VITALS
SYSTOLIC BLOOD PRESSURE: 126 MMHG | DIASTOLIC BLOOD PRESSURE: 78 MMHG | WEIGHT: 145 LBS | HEIGHT: 64 IN | BODY MASS INDEX: 24.75 KG/M2

## 2025-01-06 DIAGNOSIS — E89.0 HYPOTHYROIDISM, POSTSURGICAL: Primary | ICD-10-CM

## 2025-01-06 DIAGNOSIS — C73 MALIGNANT NEOPLASM OF THYROID GLAND (MULTI): ICD-10-CM

## 2025-01-06 PROCEDURE — 99214 OFFICE O/P EST MOD 30 MIN: CPT | Performed by: STUDENT IN AN ORGANIZED HEALTH CARE EDUCATION/TRAINING PROGRAM

## 2025-01-06 PROCEDURE — 3078F DIAST BP <80 MM HG: CPT | Performed by: STUDENT IN AN ORGANIZED HEALTH CARE EDUCATION/TRAINING PROGRAM

## 2025-01-06 PROCEDURE — 3074F SYST BP LT 130 MM HG: CPT | Performed by: STUDENT IN AN ORGANIZED HEALTH CARE EDUCATION/TRAINING PROGRAM

## 2025-01-06 PROCEDURE — 3008F BODY MASS INDEX DOCD: CPT | Performed by: STUDENT IN AN ORGANIZED HEALTH CARE EDUCATION/TRAINING PROGRAM

## 2025-01-06 RX ORDER — LIOTHYRONINE SODIUM 5 UG/1
10 TABLET ORAL DAILY
Qty: 180 TABLET | Refills: 2 | Status: SHIPPED | OUTPATIENT
Start: 2025-01-06

## 2025-01-06 RX ORDER — LEVOTHYROXINE SODIUM 100 UG/1
TABLET ORAL
Qty: 90 TABLET | Refills: 2 | Status: SHIPPED | OUTPATIENT
Start: 2025-01-06

## 2025-01-06 NOTE — PROGRESS NOTES
Subjective   Patient ID: Marcia Krishna is a 59 y.o. female who presents for Hypothyroidism ( Marcia Krishna is a 59 y.o. female who presents for Thyroid Cancer  s/p total thyroidectomy in 2007 by Dr. Gordon / post surgical hypothyrosim ,now off suppressive therapy she is currently on 100 mcg qd and liothyronine 5  2 tabs daily)   Lab Results   Component Value Date    TSH 0.85 05/06/2024      HPI  The patient is a 58 yo female with ho PTC s/p total thyroidectomy in 2007 by Dr. 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  She was on Suppressive LT4 therpay for > 10 years , now with wth normal TSH goals   she reports having difficulty maintaining weight and energy and on lower Lt4 dose , she is currently o n 100 mcg  daily   Was on 100 mcg 6 pills/ week  , prior to that 112 mcg 6 pills/week          Review of Systems    Objective   Physical Exam  Constitutional:       Appearance: Normal appearance.   Neck:      Thyroid: No thyroid mass.   Cardiovascular:      Rate and Rhythm: Normal rate and regular rhythm.   Pulmonary:      Effort: Pulmonary effort is normal.      Breath sounds: Normal breath sounds.   Lymphadenopathy:      Cervical: No cervical adenopathy.   Neurological:      General: No focal deficit present.      Mental Status: She is alert.   Psychiatric:         Mood and Affect: Mood normal.      Visit Vitals  OB Status Menopausal   Smoking Status Never        Assessment/Plan          The patient is a 58 yo female with ho PTC s/p total thyroidectomy in 2007 by Dr. 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 TG  <0.1 , free T4 wnl TSH not available she will get redraw      Advised to follow up with new endocrinologist in 6 months

## 2025-01-07 ENCOUNTER — LAB (OUTPATIENT)
Dept: LAB | Facility: LAB | Age: 60
End: 2025-01-07
Payer: OTHER GOVERNMENT

## 2025-01-07 DIAGNOSIS — E89.0 HYPOTHYROIDISM, POSTSURGICAL: ICD-10-CM

## 2025-01-07 LAB — TSH SERPL-ACNC: 3.2 MIU/L (ref 0.44–3.98)

## 2025-01-07 PROCEDURE — 84443 ASSAY THYROID STIM HORMONE: CPT

## 2025-01-24 ENCOUNTER — LAB (OUTPATIENT)
Dept: LAB | Facility: LAB | Age: 60
End: 2025-01-24
Payer: OTHER GOVERNMENT

## 2025-01-24 DIAGNOSIS — L64.8 OTHER ANDROGENIC ALOPECIA: Primary | ICD-10-CM

## 2025-01-24 LAB
25(OH)D3 SERPL-MCNC: 30 NG/ML (ref 30–100)
DHEA-S SERPL-MCNC: 56 UG/DL (ref 30–260)
FERRITIN SERPL-MCNC: 94 NG/ML (ref 8–150)
FOLATE SERPL-MCNC: 22.7 NG/ML

## 2025-01-24 PROCEDURE — 82746 ASSAY OF FOLIC ACID SERUM: CPT

## 2025-01-24 PROCEDURE — 86038 ANTINUCLEAR ANTIBODIES: CPT

## 2025-01-24 PROCEDURE — 82728 ASSAY OF FERRITIN: CPT

## 2025-01-24 PROCEDURE — 82627 DEHYDROEPIANDROSTERONE: CPT

## 2025-01-24 PROCEDURE — 82306 VITAMIN D 25 HYDROXY: CPT

## 2025-01-27 LAB — ZINC SERPL-MCNC: 94.6 UG/DL (ref 60–120)

## 2025-01-28 LAB
ANA SER QL HEP2 SUBST: NEGATIVE
TESTOSTERONE FREE (CHAN): 1.1 PG/ML (ref 0.1–6.4)
TESTOSTERONE,TOTAL,LC-MS/MS: 20 NG/DL (ref 2–45)

## 2025-03-14 DIAGNOSIS — E89.0 HYPOTHYROIDISM, POSTSURGICAL: ICD-10-CM

## 2025-03-14 DIAGNOSIS — C73 MALIGNANT NEOPLASM OF THYROID GLAND (MULTI): ICD-10-CM

## 2025-03-20 DIAGNOSIS — J30.9 ALLERGIC RHINITIS, UNSPECIFIED SEASONALITY, UNSPECIFIED TRIGGER: ICD-10-CM

## 2025-03-21 RX ORDER — MONTELUKAST SODIUM 10 MG/1
10 TABLET ORAL DAILY
Qty: 90 TABLET | Refills: 1 | Status: SHIPPED | OUTPATIENT
Start: 2025-03-21

## 2025-05-12 ENCOUNTER — TELEPHONE (OUTPATIENT)
Dept: CARDIOLOGY | Facility: CLINIC | Age: 60
End: 2025-05-12

## 2025-05-12 ENCOUNTER — OFFICE VISIT (OUTPATIENT)
Dept: PRIMARY CARE | Facility: CLINIC | Age: 60
End: 2025-05-12
Payer: OTHER GOVERNMENT

## 2025-05-12 VITALS
WEIGHT: 146 LBS | BODY MASS INDEX: 24.92 KG/M2 | SYSTOLIC BLOOD PRESSURE: 143 MMHG | HEIGHT: 64 IN | HEART RATE: 82 BPM | DIASTOLIC BLOOD PRESSURE: 95 MMHG

## 2025-05-12 DIAGNOSIS — K05.00 GINGIVITIS, ACUTE: ICD-10-CM

## 2025-05-12 DIAGNOSIS — I10 HTN (HYPERTENSION), BENIGN: ICD-10-CM

## 2025-05-12 DIAGNOSIS — K05.00 GINGIVITIS, ACUTE: Primary | ICD-10-CM

## 2025-05-12 PROBLEM — J04.0 LARYNGITIS: Status: RESOLVED | Noted: 2024-11-20 | Resolved: 2025-05-12

## 2025-05-12 PROBLEM — J06.9 ACUTE UPPER RESPIRATORY INFECTION: Status: RESOLVED | Noted: 2024-11-20 | Resolved: 2025-05-12

## 2025-05-12 PROBLEM — J01.90 ACUTE SINUSITIS: Status: RESOLVED | Noted: 2024-12-11 | Resolved: 2025-05-12

## 2025-05-12 PROCEDURE — 1036F TOBACCO NON-USER: CPT | Performed by: INTERNAL MEDICINE

## 2025-05-12 PROCEDURE — 3008F BODY MASS INDEX DOCD: CPT | Performed by: INTERNAL MEDICINE

## 2025-05-12 PROCEDURE — 3080F DIAST BP >= 90 MM HG: CPT | Performed by: INTERNAL MEDICINE

## 2025-05-12 PROCEDURE — 3077F SYST BP >= 140 MM HG: CPT | Performed by: INTERNAL MEDICINE

## 2025-05-12 PROCEDURE — 99213 OFFICE O/P EST LOW 20 MIN: CPT | Performed by: INTERNAL MEDICINE

## 2025-05-12 RX ORDER — AMOXICILLIN AND CLAVULANATE POTASSIUM 875; 125 MG/1; MG/1
875 TABLET, FILM COATED ORAL 2 TIMES DAILY
Qty: 14 TABLET | Refills: 0 | Status: SHIPPED | OUTPATIENT
Start: 2025-05-12 | End: 2025-05-12 | Stop reason: SDUPTHER

## 2025-05-12 RX ORDER — AMOXICILLIN AND CLAVULANATE POTASSIUM 875; 125 MG/1; MG/1
875 TABLET, FILM COATED ORAL 2 TIMES DAILY
Qty: 14 TABLET | Refills: 0 | Status: SHIPPED | OUTPATIENT
Start: 2025-05-12 | End: 2025-05-19

## 2025-05-12 ASSESSMENT — LIFESTYLE VARIABLES
HAVE YOU OR SOMEONE ELSE BEEN INJURED AS A RESULT OF YOUR DRINKING: NO
HOW OFTEN DO YOU HAVE SIX OR MORE DRINKS ON ONE OCCASION: LESS THAN MONTHLY
HAS A RELATIVE, FRIEND, DOCTOR, OR ANOTHER HEALTH PROFESSIONAL EXPRESSED CONCERN ABOUT YOUR DRINKING OR SUGGESTED YOU CUT DOWN: NO
AUDIT TOTAL SCORE: -1
HOW OFTEN DO YOU HAVE A DRINK CONTAINING ALCOHOL: 2-4 TIMES A MONTH
AUDIT-C TOTAL SCORE: 3
HOW MANY STANDARD DRINKS CONTAINING ALCOHOL DO YOU HAVE ON A TYPICAL DAY: 1 OR 2
SKIP TO QUESTIONS 9-10: 0

## 2025-05-12 NOTE — TELEPHONE ENCOUNTER
Patient says RX was sent to the wrong pharmacy - please resend script for amoxicillin-clavulanate (Augmentin) 875-125 mg tablet to     [facility]   [address], [facility] [address]  Phone: [phone]  Fax: [phone]

## 2025-05-12 NOTE — PROGRESS NOTES
Assessment & Plan  Lip irritation with possible secondary infection  Recent lip irritation, swelling, and redness likely due to chapstick change. Possible secondary infection indicated by inner mouth irritation.  - Prescribed Augmentin for 7 days. Discussed potential side effect of loose stools.  - Advised to avoid new chapstick.  - Recommended Aquaphor for symptomatic relief.  - Instructed to contact office if symptoms do not improve or worsen.    Hypertension  Elevated blood pressure today. Previous readings variable. Current stressors may contribute. Emphasized importance of monitoring and recording blood pressure.  - Monitor blood pressure at different times and record ten readings.  - Follow up with Dr. Sapp for management and prescription refills.  - Consider medication adjustment if consistently elevated readings.    Allergic Rhinitis  Chronic condition managed with Zyrtec and Montelukast.    Patient was advised to follow-up with the PCP for her regular care.       Assessment/Plan     Problem List Items Addressed This Visit       HTN (hypertension), benign    Gingivitis, acute - Primary    Relevant Medications    amoxicillin-clavulanate (Augmentin) 875-125 mg tablet       Subjective 0    Patient ID: Marcia Krishna is a 59 y.o. female who presents for lips swollen, red, itchy x 1.5 weeks.    History of Present Illness  Marcia Krishna is a 59 year old female who presents with swollen and irritated lips.    She has experienced swollen and irritated lips for the first time, which she attributes to her habit of chewing on her lips when they become chapped. The lips are described as 'super red' and painful. No itching elsewhere on her body and no hives, which she usually gets. She has tried using Benadryl cream and over-the-counter topical treatments without relief.    She recently changed to a different type of chapstick, which she suspects might have contributed to the irritation. She has since stopped using the new  chapstick and finds that Aquaphor provides some relief. No sores or blisters in her mouth and no fever or other rashes.    Her family history includes her girlfriend and mother both experiencing cold sores, although she has never had one herself. She has not traveled recently but has been around her grandchildren, dogs, cats, and plants. She has been under stress due to her parents' recent hospitalizations and her 's health issues.    She is currently taking diltiazem 300 mg at night for blood pressure, montelukast, and Zyrtec for allergies. She has not used her inhalers recently. She denies any allergies to antibiotics or other medications and does not smoke, although she was exposed to secondhand smoke as a child.       Surgical History[1]     ROS    Family History[2]   Social History     Socioeconomic History    Marital status:      Spouse name: Not on file    Number of children: Not on file    Years of education: Not on file    Highest education level: Not on file   Occupational History    Not on file   Tobacco Use    Smoking status: Never     Passive exposure: Past    Smokeless tobacco: Never   Vaping Use    Vaping status: Never Used   Substance and Sexual Activity    Alcohol use: Yes     Alcohol/week: 1.0 standard drink of alcohol     Types: 1 Standard drinks or equivalent per week    Drug use: Never    Sexual activity: Not on file   Other Topics Concern    Not on file   Social History Narrative    Not on file     Social Drivers of Health     Financial Resource Strain: Not on file   Food Insecurity: Not on file   Transportation Needs: Not on file   Physical Activity: Not on file   Stress: Not on file   Social Connections: Not on file   Intimate Partner Violence: Not on file   Housing Stability: Not on file      Mold, Pine needle oil, and Pollen extracts   Current Rx[3]       Objective     Vitals:    05/12/25 1558   BP: (!) 143/95   Pulse: 82        Physical Exam    Alert, oriented x3, not in  distress.  Not pale, no cyanosis, no icterus. No skin rash  Neck:  Supple.  No JVD. No thyromegaly, trachea in the midline  No clubbing.  Has inflamed upper and lower lip.  Lower lip has swelling and redness.  No blisters noted no specific ulcerations noted in the lips.  Mouth cavity has mild inflammation on the left side.  No ulcerations.  Throat has no inflammation and no tonsillar exudates. No lymphadenopathy.  Respiratory System:  Vesicular breathing moderate air entry and breath sounds.  No wheezing and no rales. No pleural rub.  Cardiovascular:  S1 and S2 positive.  No murmur.  Regular rate and rhythm.  Extremities: No leg edema.  Gait is normal    Problem List Items Addressed This Visit       HTN (hypertension), benign    Gingivitis, acute - Primary    Relevant Medications    amoxicillin-clavulanate (Augmentin) 875-125 mg tablet        No orders of the defined types were placed in this encounter.       @LASTLAB[<insert lab base name>:<insert number of results or*for all>,<repeat format for multiple labs>]@  Lab Results   Component Value Date    CHOL 215 (H) 2024    LDLCALC 134 (H) 2024    CHHDL 3.1 2024       Imaging  No results found.    Cardiology, Vascular, and Other Imaging  No other imaging results found for the past 7 days            This medical note was created with the assistance of artificial intelligence (AI) for documentation purposes. The content has been reviewed and confirmed by the healthcare provider for accuracy and completeness. Patient consented to the use of audio recording and use of AI during their visit.        [1]   Past Surgical History:  Procedure Laterality Date     SECTION, CLASSIC  2014     Section    OTHER SURGICAL HISTORY  2018    Breast Surgery Enlargement Procedure Bilateral    TOTAL ABDOMINAL HYSTERECTOMY W/ BILATERAL SALPINGOOPHORECTOMY  2014    Total Abdominal Hysterectomy With Removal Of Both Ovaries    TOTAL  THYROIDECTOMY  01/20/2014    Thyroid Surgery Total Thyroidectomy   [2]   Family History  Problem Relation Name Age of Onset    Hypertension Mother      Osteoporosis Mother      Stroke Mother      Other (cardiac disorder) Father      Other (coagulation disorder) Father      Hypertension Father     [3]   Current Outpatient Medications   Medication Sig Dispense Refill    albuterol (ProAir HFA) 90 mcg/actuation inhaler Inhale 2 puffs every 4 hours if needed for wheezing or shortness of breath. 8.5 g 0    cetirizine (ZyrTEC) 10 mg tablet Take 1 tablet (10 mg) by mouth once daily.      dilTIAZem ER (Tiazac) 300 mg 24 hr capsule TAKE 1 CAPSULE DAILY 90 capsule 3    levothyroxine (Synthroid, Levoxyl) 100 mcg tablet Take 1 tablet daily 90 tablet 2    liothyronine (Cytomel) 5 mcg tablet Take 2 tablets (10 mcg) by mouth once daily. 180 tablet 2    montelukast (Singulair) 10 mg tablet TAKE 1 TABLET DAILY AS DIRECTED 90 tablet 1    amoxicillin-clavulanate (Augmentin) 875-125 mg tablet Take 1 tablet (875 mg) by mouth 2 times a day for 7 days. 14 tablet 0    fluticasone (Flonase) 50 mcg/actuation nasal spray Administer 1 spray into each nostril once daily. Shake gently. Before first use, prime pump. After use, clean tip and replace cap. (Patient not taking: Reported on 5/12/2025) 16 g 11    pantoprazole (ProtoNix) 40 mg EC tablet Take 1 tablet (40 mg) by mouth once daily. Do not crush, chew, or split. (Patient not taking: Reported on 1/6/2025) 30 tablet 0     No current facility-administered medications for this visit.

## 2025-05-26 DIAGNOSIS — C73 MALIGNANT NEOPLASM OF THYROID GLAND (MULTI): ICD-10-CM

## 2025-05-26 DIAGNOSIS — E89.0 HYPOTHYROIDISM, POSTSURGICAL: ICD-10-CM

## 2025-06-14 LAB
T4 FREE SERPL-MCNC: 1.3 NG/DL (ref 0.8–1.8)
THYROGLOB AB SERPL-ACNC: <1 IU/ML
THYROGLOB SERPL-MCNC: <0.1 NG/ML
TSH SERPL-ACNC: 1.61 MIU/L (ref 0.4–4.5)

## 2025-06-17 ENCOUNTER — APPOINTMENT (OUTPATIENT)
Dept: PRIMARY CARE | Facility: CLINIC | Age: 60
End: 2025-06-17
Payer: OTHER GOVERNMENT

## 2025-06-17 VITALS
HEART RATE: 64 BPM | TEMPERATURE: 98.1 F | BODY MASS INDEX: 28.35 KG/M2 | SYSTOLIC BLOOD PRESSURE: 127 MMHG | WEIGHT: 144.4 LBS | DIASTOLIC BLOOD PRESSURE: 80 MMHG | HEIGHT: 60 IN

## 2025-06-17 DIAGNOSIS — R63.5 ABNORMAL WEIGHT GAIN: ICD-10-CM

## 2025-06-17 DIAGNOSIS — C73 MALIGNANT NEOPLASM OF THYROID GLAND (MULTI): ICD-10-CM

## 2025-06-17 DIAGNOSIS — Z78.9 NEVER SMOKED TOBACCO: ICD-10-CM

## 2025-06-17 DIAGNOSIS — I10 HTN (HYPERTENSION), BENIGN: ICD-10-CM

## 2025-06-17 DIAGNOSIS — Z00.00 WELL ADULT HEALTH CHECK: ICD-10-CM

## 2025-06-17 DIAGNOSIS — E78.00 HYPERCHOLESTEROLEMIA: ICD-10-CM

## 2025-06-17 DIAGNOSIS — Z12.31 ENCOUNTER FOR SCREENING MAMMOGRAM FOR MALIGNANT NEOPLASM OF BREAST: ICD-10-CM

## 2025-06-17 PROBLEM — Z86.79 HISTORY OF HYPERTENSION: Status: ACTIVE | Noted: 2025-06-17

## 2025-06-17 PROBLEM — R05.9 COUGH: Status: ACTIVE | Noted: 2025-06-17

## 2025-06-17 PROBLEM — R06.09 DYSPNEA ON EXERTION: Status: ACTIVE | Noted: 2025-06-17

## 2025-06-17 PROBLEM — R20.8 DYSESTHESIA: Status: ACTIVE | Noted: 2025-06-17

## 2025-06-17 PROBLEM — U07.1 DISEASE DUE TO SEVERE ACUTE RESPIRATORY SYNDROME CORONAVIRUS 2 (SARS-COV-2): Status: ACTIVE | Noted: 2025-06-17

## 2025-06-17 PROCEDURE — 3008F BODY MASS INDEX DOCD: CPT | Performed by: INTERNAL MEDICINE

## 2025-06-17 PROCEDURE — 99396 PREV VISIT EST AGE 40-64: CPT | Performed by: INTERNAL MEDICINE

## 2025-06-17 PROCEDURE — 3079F DIAST BP 80-89 MM HG: CPT | Performed by: INTERNAL MEDICINE

## 2025-06-17 PROCEDURE — 3074F SYST BP LT 130 MM HG: CPT | Performed by: INTERNAL MEDICINE

## 2025-06-17 PROCEDURE — 1036F TOBACCO NON-USER: CPT | Performed by: INTERNAL MEDICINE

## 2025-06-17 RX ORDER — DILTIAZEM HYDROCHLORIDE 300 MG/1
300 CAPSULE, EXTENDED RELEASE ORAL DAILY
Qty: 90 CAPSULE | Refills: 3 | Status: SHIPPED | OUTPATIENT
Start: 2025-06-17

## 2025-06-17 RX ORDER — CLOBETASOL PROPIONATE 0.5 MG/ML
SOLUTION TOPICAL
COMMUNITY
Start: 2025-05-23

## 2025-06-17 ASSESSMENT — PATIENT HEALTH QUESTIONNAIRE - PHQ9
2. FEELING DOWN, DEPRESSED OR HOPELESS: NOT AT ALL
SUM OF ALL RESPONSES TO PHQ9 QUESTIONS 1 AND 2: 0
1. LITTLE INTEREST OR PLEASURE IN DOING THINGS: NOT AT ALL

## 2025-06-17 NOTE — PROGRESS NOTES
Assessment/Plan   Problem List Items Addressed This Visit       Abnormal weight gain    Malignant neoplasm of thyroid gland (Multi)    HTN (hypertension), benign    Relevant Medications    dilTIAZem ER (Tiazac) 300 mg 24 hr capsule    Other Relevant Orders    Comprehensive Metabolic Panel    Hypercholesterolemia    Relevant Orders    Lipid Panel    Never smoked tobacco     Other Visit Diagnoses         Well adult health check        Relevant Orders    Comprehensive Metabolic Panel      Body mass index (BMI) of 28.0-28.9 in adult        Relevant Orders    Referral to Nutrition Services      Encounter for screening mammogram for malignant neoplasm of breast        Relevant Orders    BI mammo bilateral screening tomosynthesis        Advised to continue follow-up with endocrinologist  Labs for thyroid function monitoring.  Other labs as ordered  See the dietitian for dietary consultation to prevent weight gain  Follow-up as needed  All aspects of preventive care discussed    Subjective   Patient ID: Marcia Krishna is a 60 y.o. female who presents for Annual Exam (Physical).    Surgical History[1]   Family History[2]   Social History     Socioeconomic History    Marital status:      Spouse name: Not on file    Number of children: Not on file    Years of education: Not on file    Highest education level: Not on file   Occupational History    Not on file   Tobacco Use    Smoking status: Never     Passive exposure: Past    Smokeless tobacco: Never   Vaping Use    Vaping status: Never Used   Substance and Sexual Activity    Alcohol use: Yes     Alcohol/week: 1.0 standard drink of alcohol     Types: 1 Standard drinks or equivalent per week    Drug use: Never    Sexual activity: Not on file   Other Topics Concern    Not on file   Social History Narrative    Not on file     Social Drivers of Health     Financial Resource Strain: Not on file   Food Insecurity: Not on file   Transportation Needs: Not on file   Physical  Activity: Not on file   Stress: Not on file   Social Connections: Not on file   Intimate Partner Violence: Not on file   Housing Stability: Not on file      Mold, Pine needle oil, and Pollen extracts   Current Rx[3]   Vitals:    06/17/25 0945   BP: 127/80   BP Location: Left arm   Patient Position: Sitting   Pulse: 64   Temp: 36.7 °C (98.1 °F)   Weight: 65.5 kg (144 lb 6.4 oz)   Height: 1.524 m (5')      Problem List Items Addressed This Visit       Abnormal weight gain    Malignant neoplasm of thyroid gland (Multi)    HTN (hypertension), benign    Relevant Medications    dilTIAZem ER (Tiazac) 300 mg 24 hr capsule    Other Relevant Orders    Comprehensive Metabolic Panel    Hypercholesterolemia    Relevant Orders    Lipid Panel    Never smoked tobacco     Other Visit Diagnoses         Well adult health check        Relevant Orders    Comprehensive Metabolic Panel      Body mass index (BMI) of 28.0-28.9 in adult        Relevant Orders    Referral to Nutrition Services      Encounter for screening mammogram for malignant neoplasm of breast        Relevant Orders    BI mammo bilateral screening tomosynthesis           Orders Placed This Encounter   Procedures    BI mammo bilateral screening tomosynthesis     Standing Status:   Future     Expected Date:   6/17/2025     Expiration Date:   8/17/2026     Reason for exam::   Screening     Radiologist to Determine Optimal Study:   Yes     Release result to [facility] [employer] [employer]:   Immediate [1]     Is this exam part of a Research Study? If Yes, link this order to the research study:   No    Comprehensive Metabolic Panel     Standing Status:   Future     Number of Occurrences:   1     Expected Date:   6/17/2025     Expiration Date:   6/17/2026     Release result to [employer]:   Immediate    Lipid Panel     Standing Status:   Future     Number of Occurrences:   1     Expected Date:   6/17/2025     Expiration Date:   6/17/2026     [facility]:   Immediate    Referral to Nutrition  "Services     Standing Status:   Future     Expected Date:   2025     Expiration Date:   2026     Referral Priority:   Routine     Referral Type:   Consultation     Referral Reason:   Specialty Services Required     Requested Specialty:   Nutrition     Number of Visits Requested:   1        HPI  Wellness visit and follow-up  Concerned about weight gain  Otherwise no symptom  Following endocrinologist  History of thyroid cancer currently on replacement with T3 and T4  Labs reviewed    ROS  Negative  Past medical history reviewed  Social and family history reviewed  Allergies and medications reviewed  Recent labs reviewed  Vital signs reviewed    PHYSICAL EXAM  Normal cardiovascular respiratory abdominal and neurological examination      No results found for: \"PR1\", \"BMPR1A\", \"CMPLAS\", \"LC4YRKUC\", \"KPSAT\"   Lab Results   Component Value Date    CHOL 215 (H) 2024    LDLCALC 134 (H) 2024    CHHDL 3.1 2024     Lab Results   Component Value Date    TSH 1.61 2025                                          [1]   Past Surgical History:  Procedure Laterality Date     SECTION, CLASSIC  2014     Section    OTHER SURGICAL HISTORY  2018    Breast Surgery Enlargement Procedure Bilateral    TOTAL ABDOMINAL HYSTERECTOMY W/ BILATERAL SALPINGOOPHORECTOMY  2014    Total Abdominal Hysterectomy With Removal Of Both Ovaries    TOTAL THYROIDECTOMY  2014    Thyroid Surgery Total Thyroidectomy   [2]   Family History  Problem Relation Name Age of Onset    Hypertension Mother      Osteoporosis Mother      Stroke Mother      Other (cardiac disorder) Father      Other (coagulation disorder) Father      Hypertension Father     [3]   Current Outpatient Medications   Medication Sig Dispense Refill    albuterol (ProAir HFA) 90 mcg/actuation inhaler Inhale 2 puffs every 4 hours if needed for wheezing or shortness of breath. 8.5 g 0    levothyroxine (Synthroid, Levoxyl) 100 mcg " tablet Take 1 tablet daily 90 tablet 2    liothyronine (Cytomel) 5 mcg tablet Take 2 tablets (10 mcg) by mouth once daily. 180 tablet 2    montelukast (Singulair) 10 mg tablet TAKE 1 TABLET DAILY AS DIRECTED 90 tablet 1    cetirizine (ZyrTEC) 10 mg tablet Take 1 tablet (10 mg) by mouth once daily.      clobetasol (Temovate) 0.05 % external solution Apply to scalp at bedtime for two weeks then take two week break (Patient not taking: Reported on 6/17/2025)      dilTIAZem ER (Tiazac) 300 mg 24 hr capsule Take 1 capsule (300 mg) by mouth once daily. 90 capsule 3     No current facility-administered medications for this visit.

## 2025-06-20 ENCOUNTER — APPOINTMENT (OUTPATIENT)
Dept: ENDOCRINOLOGY | Facility: CLINIC | Age: 60
End: 2025-06-20
Payer: OTHER GOVERNMENT

## 2025-06-20 DIAGNOSIS — E89.0 HYPOTHYROIDISM, POSTSURGICAL: Primary | ICD-10-CM

## 2025-06-20 DIAGNOSIS — R63.5 WEIGHT GAIN: ICD-10-CM

## 2025-06-20 DIAGNOSIS — C73 MALIGNANT NEOPLASM OF THYROID GLAND (MULTI): ICD-10-CM

## 2025-06-20 PROCEDURE — 99214 OFFICE O/P EST MOD 30 MIN: CPT | Performed by: HOSPITALIST

## 2025-06-20 RX ORDER — LIOTHYRONINE SODIUM 5 UG/1
10 TABLET ORAL DAILY
Qty: 180 TABLET | Refills: 2 | Status: SHIPPED | OUTPATIENT
Start: 2025-06-20

## 2025-06-20 RX ORDER — LEVOTHYROXINE SODIUM 100 UG/1
TABLET ORAL
Qty: 90 TABLET | Refills: 2 | Status: SHIPPED | OUTPATIENT
Start: 2025-06-20

## 2025-06-20 ASSESSMENT — ENCOUNTER SYMPTOMS
DIARRHEA: 0
DYSURIA: 0
TREMORS: 0
VOMITING: 0
ARTHRALGIAS: 0
NAUSEA: 0
UNEXPECTED WEIGHT CHANGE: 1
ABDOMINAL DISTENTION: 0
TROUBLE SWALLOWING: 0
CONSTIPATION: 0
LIGHT-HEADEDNESS: 0
NERVOUS/ANXIOUS: 0
FREQUENCY: 0
VOICE CHANGE: 0
PHOTOPHOBIA: 0
SHORTNESS OF BREATH: 0
HEADACHES: 0
CHEST TIGHTNESS: 0
SLEEP DISTURBANCE: 1
ABDOMINAL PAIN: 0
PALPITATIONS: 0
SORE THROAT: 0
EYE ITCHING: 0
AGITATION: 0

## 2025-06-20 NOTE — PROGRESS NOTES
Subjective   Patient ID:   NEW PATIENT____    Virtual visit::   Transition from Dr. Korin Jimenez (left  practice 1/2025)  Daughter: Eden Santos    # Thyroid Cancer  s/p total thyroidectomy in 2007 by Dr. Gordon / post surgical hypothyroid   Current regimen: levothyroxine 100 mcg qd and liothyronine 5mcg  2 tabs daily    Lab Results   Component Value Date    TSH 1.61 06/11/2025      HPI  See AP     Review of Systems   Constitutional:  Positive for unexpected weight change.   HENT:  Negative for sore throat, trouble swallowing and voice change.    Eyes:  Negative for photophobia, itching and visual disturbance.   Respiratory:  Negative for chest tightness and shortness of breath.    Cardiovascular:  Negative for chest pain and palpitations.   Gastrointestinal:  Negative for abdominal distention, abdominal pain, constipation, diarrhea, nausea and vomiting.   Endocrine: Negative for cold intolerance, heat intolerance and polyuria.   Genitourinary:  Negative for dysuria and frequency.   Musculoskeletal:  Negative for arthralgias.   Skin:  Negative for pallor.   Allergic/Immunologic: Negative for environmental allergies.   Neurological:  Negative for tremors, light-headedness and headaches.   Psychiatric/Behavioral:  Positive for sleep disturbance. Negative for agitation. The patient is not nervous/anxious.        Objective   Physical Exam  Constitutional:       Appearance: Normal appearance.   Neck:      Thyroid: No thyroid mass.   Cardiovascular:      Rate and Rhythm: Normal rate and regular rhythm.   Pulmonary:      Effort: Pulmonary effort is normal.      Breath sounds: Normal breath sounds.   Lymphadenopathy:      Cervical: No cervical adenopathy.   Neurological:      General: No focal deficit present.      Mental Status: She is alert.   Psychiatric:         Mood and Affect: Mood normal.      Visit Vitals  OB Status Menopausal   Smoking Status Never    No Vitals Due to Vurtual Visit  NAD    EOM  nml    MMM    Mood nml    Assessment/Plan     THYROID CANCER  Hypothyroidism   Weight gain       59 yo female with ho PTC s/p total thyroidectomy in 2007 by Dr. 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 )  She exercises 4 times a week and eats blanced meals      History :  previously followed with Dr. Tim diaz  She was on Suppressive LT4 therpay for > 10 years , now with wth normal TSH goals      Current regimen :    Levothyroxine 100 mcg daily - takes with cytomel   Liothyronine 5 mcg daily     TSH 1.61 TG < 0.1 ATG < 1     H/o Total hysterectomy and b/l oophorectomy in her early 40s, was on HRT very briefly    She is doing overall  well   She gained 10 lbs since in 1 yr, some hair looss, 1-2 hair on chin,   Sleep- wakes up frequently - not new   Doesn't snore      PLAN    Continue same dose   Labs before NV        Wt gain : unclear cause    DHEAS nml    Testosterone level nml  Continue lifestyle modifications    Aim 0.7g/lb protein a day     RTC 6m      SH - smart bursiness. M-f   Weights - 4 times / week    Kids- 2 boys , 4 GD   Daughter: Eden Santos I spent a total of 30 minutes on the date of the service which included preparing to see the patient, face-to-face patient care, completing clinical documentation, obtaining and/or reviewing separately obtained history, performing a medically appropriate examination, counseling and educating the patient/family/caregiver, and ordering medications, tests, or procedures.        Virtual or Telephone Consent    An interactive audio and video telecommunication system which permits real time communications between the patient (at the originating site) and provider (at the distant site) was utilized to provide this telehealth service.   Verbal consent was requested and obtained from Marcia Krishna on this date, 06/20/25 for a telehealth visit and the patient's location was confirmed at the time of the  visit.

## 2025-07-22 ENCOUNTER — APPOINTMENT (OUTPATIENT)
Dept: NUTRITION | Facility: CLINIC | Age: 60
End: 2025-07-22
Payer: OTHER GOVERNMENT

## 2025-07-31 ENCOUNTER — HOSPITAL ENCOUNTER (OUTPATIENT)
Dept: RADIOLOGY | Facility: CLINIC | Age: 60
Discharge: HOME | End: 2025-07-31
Payer: OTHER GOVERNMENT

## 2025-07-31 VITALS — WEIGHT: 144 LBS | BODY MASS INDEX: 24.59 KG/M2 | HEIGHT: 64 IN

## 2025-07-31 DIAGNOSIS — Z12.31 ENCOUNTER FOR SCREENING MAMMOGRAM FOR MALIGNANT NEOPLASM OF BREAST: ICD-10-CM

## 2025-07-31 PROCEDURE — 77067 SCR MAMMO BI INCL CAD: CPT | Performed by: RADIOLOGY

## 2025-07-31 PROCEDURE — 77063 BREAST TOMOSYNTHESIS BI: CPT | Performed by: RADIOLOGY

## 2025-07-31 PROCEDURE — 77063 BREAST TOMOSYNTHESIS BI: CPT

## 2025-09-05 LAB
ALBUMIN SERPL-MCNC: 4.4 G/DL (ref 3.6–5.1)
ALP SERPL-CCNC: 92 U/L (ref 37–153)
ALT SERPL-CCNC: 26 U/L (ref 6–29)
ANION GAP SERPL CALCULATED.4IONS-SCNC: 9 MMOL/L (CALC) (ref 7–17)
AST SERPL-CCNC: 23 U/L (ref 10–35)
BILIRUB SERPL-MCNC: 0.8 MG/DL (ref 0.2–1.2)
BUN SERPL-MCNC: 23 MG/DL (ref 7–25)
CALCIUM SERPL-MCNC: 9.3 MG/DL (ref 8.6–10.4)
CHLORIDE SERPL-SCNC: 102 MMOL/L (ref 98–110)
CHOLEST SERPL-MCNC: 214 MG/DL
CHOLEST/HDLC SERPL: 2.6 (CALC)
CO2 SERPL-SCNC: 30 MMOL/L (ref 20–32)
CREAT SERPL-MCNC: 0.81 MG/DL (ref 0.5–1.03)
EGFRCR SERPLBLD CKD-EPI 2021: 84 ML/MIN/1.73M2
GLUCOSE SERPL-MCNC: 99 MG/DL (ref 65–99)
HDLC SERPL-MCNC: 82 MG/DL
LDLC SERPL CALC-MCNC: 117 MG/DL (CALC)
NONHDLC SERPL-MCNC: 132 MG/DL (CALC)
POTASSIUM SERPL-SCNC: 4.6 MMOL/L (ref 3.5–5.3)
PROT SERPL-MCNC: 6.5 G/DL (ref 6.1–8.1)
SODIUM SERPL-SCNC: 141 MMOL/L (ref 135–146)
TRIGL SERPL-MCNC: 54 MG/DL

## 2025-09-30 ENCOUNTER — APPOINTMENT (OUTPATIENT)
Dept: PRIMARY CARE | Facility: CLINIC | Age: 60
End: 2025-09-30
Payer: OTHER GOVERNMENT

## 2025-12-22 ENCOUNTER — APPOINTMENT (OUTPATIENT)
Dept: ENDOCRINOLOGY | Facility: CLINIC | Age: 60
End: 2025-12-22
Payer: OTHER GOVERNMENT

## 2025-12-30 ENCOUNTER — APPOINTMENT (OUTPATIENT)
Dept: ENDOCRINOLOGY | Facility: CLINIC | Age: 60
End: 2025-12-30
Payer: OTHER GOVERNMENT

## 2026-06-30 ENCOUNTER — APPOINTMENT (OUTPATIENT)
Dept: ENDOCRINOLOGY | Facility: CLINIC | Age: 61
End: 2026-06-30
Payer: OTHER GOVERNMENT